# Patient Record
Sex: MALE | Race: WHITE | NOT HISPANIC OR LATINO | Employment: FULL TIME | URBAN - METROPOLITAN AREA
[De-identification: names, ages, dates, MRNs, and addresses within clinical notes are randomized per-mention and may not be internally consistent; named-entity substitution may affect disease eponyms.]

---

## 2024-11-10 ENCOUNTER — OFFICE VISIT (OUTPATIENT)
Dept: URGENT CARE | Age: 54
End: 2024-11-10
Payer: COMMERCIAL

## 2024-11-10 VITALS — HEART RATE: 73 BPM | OXYGEN SATURATION: 97 % | RESPIRATION RATE: 16 BRPM

## 2024-11-10 DIAGNOSIS — L03.011 PARONYCHIA OF FINGER, RIGHT: Primary | ICD-10-CM

## 2024-11-10 PROCEDURE — 99203 OFFICE O/P NEW LOW 30 MIN: CPT | Performed by: NURSE PRACTITIONER

## 2024-11-10 RX ORDER — CEPHALEXIN 500 MG/1
500 CAPSULE ORAL 2 TIMES DAILY
Qty: 14 CAPSULE | Refills: 0 | Status: SHIPPED | OUTPATIENT
Start: 2024-11-10 | End: 2024-11-17

## 2024-11-10 NOTE — PROGRESS NOTES
Subjective   Patient ID: Jama Wang is a 54 y.o. male. They present today with a chief complaint of Finger infecton (C/O R hand pinky infection, sx started 2 days ago.).    History of Present Illness  53 yo male coming in for possible infection of the cuticle around the right pinky. He states he noticed some tenderness a couple days ago and then started noticing the puss. He denies any fevers or chills. He denies any other complaints.     Past Medical History  Allergies as of 11/10/2024 - Reviewed 11/10/2024   Allergen Reaction Noted    Penicillins Unknown 11/10/2024       (Not in a hospital admission)       History reviewed. No pertinent past medical history.    History reviewed. No pertinent surgical history.         Review of Systems  Review of Systems:  General: No weight loss, fatigue, anorexia, insomnia, fever, chills.  Cardiac: No chest pain, palpitations, syncope, near syncope.  Pulmonary:  No shortness of breath, cough, hemoptysis  Heme/lymph: No swollen glands, fever, bleeding  Musculoskeletal: No limb pain, joint pain, joint swelling.  Skin: Positive swelling and puss around the cuticle of the 5th digit of the right hand.  Neuro: No numbness, tingling, headaches                                 Objective    Vitals:    11/10/24 1554   Pulse: 73   Resp: 16   SpO2: 97%     No LMP for male patient.    Physical Exam  Hand Injury:  General: Vitals noted, no distress. Afebrile  Cardiac: Regular rate and rhythm, no murmur.   Pulmonary: Lungs clear bilaterally with good aeration. No adventitious breath sounds.   Extremities: No peripheral edema. Exam of the right hand shows a paronychia around the cuticle of the 5th digit. The skin is intact. Is neurovascularly intact distally.   Skin: No rash  Neuro: No focal neurologic deficits    Procedures    Point of Care Test & Imaging Results from this visit  No results found for this visit on 11/10/24.   No results found.    Diagnostic study results (if any) were  reviewed by EDGARD Steward.    Assessment/Plan   Allergies, medications, history, and pertinent labs/EKGs/Imaging reviewed by EDGARD Steward.     Medical Decision Making  Treatment: Paronychia drained. Keflex prescribed  Differential: 1) paronychia , 2) foreign body , 3)  finger infection  Plan: Discussed differential with the pateint. Patient will follow up with the PCP in the next 2-3 days. Return for any worsening symptoms or go to the ER for further evaluation. Patient understands return precautions and discharege insturctions.  Impression:   1) Paronychia of right 5th finger      Orders and Diagnoses  Diagnoses and all orders for this visit:  Paronychia of finger, right  -     cephalexin (Keflex) 500 mg capsule; Take 1 capsule (500 mg) by mouth 2 times a day for 7 days.      Medical Admin Record      Patient disposition: Home    Electronically signed by EDGARD Steward  4:02 PM